# Patient Record
Sex: FEMALE | Race: WHITE | URBAN - METROPOLITAN AREA
[De-identification: names, ages, dates, MRNs, and addresses within clinical notes are randomized per-mention and may not be internally consistent; named-entity substitution may affect disease eponyms.]

---

## 2017-01-15 ENCOUNTER — OFFICE VISIT (OUTPATIENT)
Dept: URGENT CARE | Facility: URGENT CARE | Age: 37
End: 2017-01-15
Payer: COMMERCIAL

## 2017-01-15 VITALS
OXYGEN SATURATION: 98 % | BODY MASS INDEX: 21.32 KG/M2 | HEART RATE: 71 BPM | TEMPERATURE: 97.7 F | SYSTOLIC BLOOD PRESSURE: 102 MMHG | WEIGHT: 132 LBS | DIASTOLIC BLOOD PRESSURE: 76 MMHG

## 2017-01-15 DIAGNOSIS — R07.0 THROAT PAIN: Primary | ICD-10-CM

## 2017-01-15 LAB
DEPRECATED S PYO AG THROAT QL EIA: NORMAL
MICRO REPORT STATUS: NORMAL
SPECIMEN SOURCE: NORMAL

## 2017-01-15 PROCEDURE — 87880 STREP A ASSAY W/OPTIC: CPT | Performed by: INTERNAL MEDICINE

## 2017-01-15 PROCEDURE — 87081 CULTURE SCREEN ONLY: CPT | Performed by: INTERNAL MEDICINE

## 2017-01-15 PROCEDURE — 99213 OFFICE O/P EST LOW 20 MIN: CPT | Performed by: INTERNAL MEDICINE

## 2017-01-15 NOTE — NURSING NOTE
"Chief Complaint   Patient presents with     Pharyngitis     st,strep in house       Initial /76 mmHg  Pulse 71  Temp(Src) 97.7  F (36.5  C) (Oral)  Wt 132 lb (59.875 kg)  SpO2 98% Estimated body mass index is 21.32 kg/(m^2) as calculated from the following:    Height as of 8/6/12: 5' 6\" (1.676 m).    Weight as of this encounter: 132 lb (59.875 kg).  BP completed using cuff size: regular    "

## 2017-01-17 LAB
BACTERIA SPEC CULT: NORMAL
MICRO REPORT STATUS: NORMAL
SPECIMEN SOURCE: NORMAL

## 2017-01-17 NOTE — PROGRESS NOTES
[facility] Progress Note        Noreen Merritt MD, MPH  1-        History:      Jie Andrade is a pleasant 36 year old year old female with a chief complaint of sore throat and nasal congestion since 2 days ago.   No fever or chills.   No dyspnea or chest pain.   No smoking history.   No headache or neck pain.  No GI or  symptoms.   No MSK symptoms.         Assessment and Plan:        Acute URI:  - Strep, Rapid Screen  - Beta strep group A culture    Discussed supportive care with the patient.  Advised to increase fluid intake and rest.  Patient was advised to use throat lozenges and gargle with salt water for symptomatic relief.  Tylenol for pain q 6 hours prn  F/u w PCP in 4-5 days, earlier if symptoms worsen.                   Physical Exam:      /76 mmHg  Pulse 71  Temp(Src) 97.7  F (36.5  C) (Oral)  Wt 132 lb (59.875 kg)  SpO2 98%     Constitutional: Patient is in no distress The patient is pleasant and cooperative.   HEENT: Head:  Head is atraumatic, normocephalic.    Eyes: Pupils are equal, round and reactive to light and accomodation.  Sclera is non-icteric. No conjunctival injection, or exudate noted. Extraocular motion is intact. Visual acuity is intact bilaterally.  Ears:  External acoustic canals are patent and clear.  There is no erythema and bulging( exudate)  of the ( R/L ) tympanic membrane(s ).   Nose:  Nasal congestion w/o drainage or mucosal ulceration is noted.  Throat:  Oral mucosa is moist.  No oral lesions are noted.  Posterior pharyngeal hyperemia w/o exudate noted.     Neck Supple.  There is no cervical lymphadenopathy.  No nuchal rigidity noted.  There is no meningismus.     Cardiovascular: Heart is regular to rate and rhythm.  No murmur is noted.     Lungs: Clear in the anterior and posterior pulmonary fields.   Abdomen: Soft and non-tender.    Back No flank tenderness is noted.   Extremeties No edema, no calf tenderness.   Neuro: No  focal deficit.   Skin No petechiae or purpura is noted.  There is no rash.   Mood Normal              Data:      All new lab and imaging data was reviewed.   Results for orders placed or performed in visit on 01/15/17   Strep, Rapid Screen   Result Value Ref Range    Specimen Description Throat     Rapid Strep A Screen       NEGATIVE: No Group A streptococcal antigen detected by immunoassay, await   culture report.      Micro Report Status FINAL 01/15/2017

## 2017-09-17 ENCOUNTER — HEALTH MAINTENANCE LETTER (OUTPATIENT)
Age: 37
End: 2017-09-17